# Patient Record
Sex: FEMALE | Race: OTHER | NOT HISPANIC OR LATINO
[De-identification: names, ages, dates, MRNs, and addresses within clinical notes are randomized per-mention and may not be internally consistent; named-entity substitution may affect disease eponyms.]

---

## 2019-05-06 ENCOUNTER — RESULT REVIEW (OUTPATIENT)
Age: 39
End: 2019-05-06

## 2019-05-06 ENCOUNTER — APPOINTMENT (OUTPATIENT)
Dept: INTERNAL MEDICINE | Facility: CLINIC | Age: 39
End: 2019-05-06
Payer: COMMERCIAL

## 2019-05-06 VITALS
HEIGHT: 62 IN | TEMPERATURE: 98.6 F | BODY MASS INDEX: 22.26 KG/M2 | HEART RATE: 76 BPM | DIASTOLIC BLOOD PRESSURE: 80 MMHG | SYSTOLIC BLOOD PRESSURE: 100 MMHG | WEIGHT: 121 LBS | OXYGEN SATURATION: 99 %

## 2019-05-06 DIAGNOSIS — Z12.31 ENCOUNTER FOR SCREENING MAMMOGRAM FOR MALIGNANT NEOPLASM OF BREAST: ICD-10-CM

## 2019-05-06 PROCEDURE — 99202 OFFICE O/P NEW SF 15 MIN: CPT

## 2019-05-06 NOTE — HEALTH RISK ASSESSMENT
[0] : 2) Feeling down, depressed, or hopeless: Not at all (0) [HIV test declined] : HIV test declined [Hepatitis C test declined] : Hepatitis C test declined [Good] : ~his/her~ current health as good [] : No [de-identified] : social  [MammogramDate] : never [ColonoscopyDate] : never [BoneDensityDate] : never [PapSmearDate] : 2017

## 2019-05-06 NOTE — HISTORY OF PRESENT ILLNESS
[FreeTextEntry1] : new patient visit  [de-identified] : patient is here for a new patient consultation. Patient currently feels fine and has no complaints, she needs a script for a mammogram and she will schedule an annual at a later date.

## 2019-05-06 NOTE — PHYSICAL EXAM
[No Acute Distress] : no acute distress [Well Nourished] : well nourished [Well Developed] : well developed [Well-Appearing] : well-appearing [EOMI] : extraocular movements intact [PERRL] : pupils equal round and reactive to light [Normal Sclera/Conjunctiva] : normal sclera/conjunctiva [Normal Outer Ear/Nose] : the outer ears and nose were normal in appearance [Normal Oropharynx] : the oropharynx was normal [No JVD] : no jugular venous distention [No Lymphadenopathy] : no lymphadenopathy [Supple] : supple [Thyroid Normal, No Nodules] : the thyroid was normal and there were no nodules present [No Respiratory Distress] : no respiratory distress  [Clear to Auscultation] : lungs were clear to auscultation bilaterally [No Accessory Muscle Use] : no accessory muscle use [Regular Rhythm] : with a regular rhythm [Normal Rate] : normal rate  [No Murmur] : no murmur heard [Normal S1, S2] : normal S1 and S2 [Soft] : abdomen soft [Non-distended] : non-distended [No Masses] : no abdominal mass palpated [No HSM] : no HSM [Normal Posterior Cervical Nodes] : no posterior cervical lymphadenopathy [No Joint Swelling] : no joint swelling [Grossly Normal Strength/Tone] : grossly normal strength/tone [Normal Gait] : normal gait [Normal Insight/Judgement] : insight and judgment were intact

## 2019-05-07 ENCOUNTER — RECORD ABSTRACTING (OUTPATIENT)
Age: 39
End: 2019-05-07

## 2019-05-07 DIAGNOSIS — Z78.9 OTHER SPECIFIED HEALTH STATUS: ICD-10-CM

## 2019-05-20 ENCOUNTER — APPOINTMENT (OUTPATIENT)
Dept: INTERNAL MEDICINE | Facility: CLINIC | Age: 39
End: 2019-05-20
Payer: COMMERCIAL

## 2019-05-20 VITALS
HEIGHT: 62 IN | WEIGHT: 121 LBS | OXYGEN SATURATION: 99 % | BODY MASS INDEX: 22.26 KG/M2 | SYSTOLIC BLOOD PRESSURE: 100 MMHG | DIASTOLIC BLOOD PRESSURE: 60 MMHG | HEART RATE: 69 BPM

## 2019-05-20 PROCEDURE — 36415 COLL VENOUS BLD VENIPUNCTURE: CPT

## 2019-05-20 PROCEDURE — 93000 ELECTROCARDIOGRAM COMPLETE: CPT

## 2019-05-20 PROCEDURE — 99396 PREV VISIT EST AGE 40-64: CPT | Mod: 25

## 2019-05-21 NOTE — HEALTH RISK ASSESSMENT
[Very Good] : ~his/her~  mood as very good [Patient reported mammogram was normal] : Patient reported mammogram was normal [Patient reported PAP Smear was normal] : Patient reported PAP Smear was normal [HIV test declined] : HIV test declined [Hepatitis C test declined] : Hepatitis C test declined [] : No [Change in mental status noted] : No change in mental status noted [MammogramDate] : 2019 [PapSmearDate] : 2017 [BoneDensityDate] : na [ColonoscopyDate] : na

## 2019-05-21 NOTE — HISTORY OF PRESENT ILLNESS
[FreeTextEntry1] : annual exam [de-identified] : Patient is here for an annual exam. Currently feels fine. Has no complaints

## 2019-05-24 LAB
25(OH)D3 SERPL-MCNC: 23.1 NG/ML
ALBUMIN SERPL ELPH-MCNC: 4.2 G/DL
ALP BLD-CCNC: 41 U/L
ALT SERPL-CCNC: 17 U/L
ANION GAP SERPL CALC-SCNC: 10 MMOL/L
AST SERPL-CCNC: 14 U/L
BASOPHILS # BLD AUTO: 0.02 K/UL
BASOPHILS NFR BLD AUTO: 0.3 %
BILIRUB SERPL-MCNC: 0.4 MG/DL
BUN SERPL-MCNC: 15 MG/DL
CALCIUM SERPL-MCNC: 9 MG/DL
CHLORIDE SERPL-SCNC: 105 MMOL/L
CHOLEST SERPL-MCNC: 133 MG/DL
CHOLEST/HDLC SERPL: 2 RATIO
CO2 SERPL-SCNC: 24 MMOL/L
CREAT SERPL-MCNC: 0.74 MG/DL
EOSINOPHIL # BLD AUTO: 0.14 K/UL
EOSINOPHIL NFR BLD AUTO: 2.2 %
ESTIMATED AVERAGE GLUCOSE: 97 MG/DL
GLUCOSE SERPL-MCNC: 98 MG/DL
HBA1C MFR BLD HPLC: 5 %
HCT VFR BLD CALC: 40.4 %
HDLC SERPL-MCNC: 68 MG/DL
HGB BLD-MCNC: 12.5 G/DL
IMM GRANULOCYTES NFR BLD AUTO: 0.2 %
LDLC SERPL CALC-MCNC: 56 MG/DL
LYMPHOCYTES # BLD AUTO: 1.21 K/UL
LYMPHOCYTES NFR BLD AUTO: 19.1 %
MAN DIFF?: NORMAL
MCHC RBC-ENTMCNC: 30.9 GM/DL
MCHC RBC-ENTMCNC: 31.1 PG
MCV RBC AUTO: 100.5 FL
MEV IGG FLD QL IA: 242 AU/ML
MEV IGG+IGM SER-IMP: POSITIVE
MONOCYTES # BLD AUTO: 0.3 K/UL
MONOCYTES NFR BLD AUTO: 4.7 %
NEUTROPHILS # BLD AUTO: 4.67 K/UL
NEUTROPHILS NFR BLD AUTO: 73.5 %
PLATELET # BLD AUTO: 220 K/UL
POTASSIUM SERPL-SCNC: 4.6 MMOL/L
PROT SERPL-MCNC: 6.4 G/DL
RBC # BLD: 4.02 M/UL
RBC # FLD: 13.1 %
RUBV IGG FLD-ACNC: 12.7 INDEX
RUBV IGG SER-IMP: POSITIVE
SODIUM SERPL-SCNC: 139 MMOL/L
T4 SERPL-MCNC: 4.4 UG/DL
TRIGL SERPL-MCNC: 45 MG/DL
TSH SERPL-ACNC: 0.89 UIU/ML
VIT B12 SERPL-MCNC: 305 PG/ML
WBC # FLD AUTO: 6.35 K/UL

## 2019-06-03 ENCOUNTER — TRANSCRIPTION ENCOUNTER (OUTPATIENT)
Age: 39
End: 2019-06-03

## 2019-10-11 ENCOUNTER — APPOINTMENT (OUTPATIENT)
Dept: OBGYN | Facility: CLINIC | Age: 39
End: 2019-10-11
Payer: COMMERCIAL

## 2019-10-11 VITALS
HEIGHT: 62 IN | BODY MASS INDEX: 23 KG/M2 | SYSTOLIC BLOOD PRESSURE: 100 MMHG | WEIGHT: 125 LBS | DIASTOLIC BLOOD PRESSURE: 68 MMHG

## 2019-10-11 DIAGNOSIS — Z12.31 ENCOUNTER FOR SCREENING MAMMOGRAM FOR MALIGNANT NEOPLASM OF BREAST: ICD-10-CM

## 2019-10-11 DIAGNOSIS — R10.2 PELVIC AND PERINEAL PAIN: ICD-10-CM

## 2019-10-11 PROCEDURE — 99396 PREV VISIT EST AGE 40-64: CPT

## 2019-10-11 NOTE — PHYSICAL EXAM
[Awake] : awake [Mass] : no breast mass [Alert] : alert [Acute Distress] : no acute distress [Axillary LAD] : no axillary lymphadenopathy [Nipple Discharge] : no nipple discharge [Soft] : soft [Oriented x3] : oriented to person, place, and time [Tender] : non tender [No Bleeding] : there was no active vaginal bleeding [Normal] : uterus [Uterine Adnexae] : were not tender and not enlarged [FreeTextEntry4] : thin discharge c/w BV [FreeTextEntry7] : R adnexal tenderness, sharp tenderness in deep palpation

## 2019-10-21 ENCOUNTER — RESULT REVIEW (OUTPATIENT)
Age: 39
End: 2019-10-21

## 2019-11-14 LAB
CANDIDA VAG CYTO: NOT DETECTED
CYTOLOGY CVX/VAG DOC THIN PREP: NORMAL
G VAGINALIS+PREV SP MTYP VAG QL MICRO: NOT DETECTED
HPV HIGH+LOW RISK DNA PNL CVX: NOT DETECTED
T VAGINALIS VAG QL WET PREP: NOT DETECTED

## 2020-07-16 ENCOUNTER — RESULT REVIEW (OUTPATIENT)
Age: 40
End: 2020-07-16

## 2020-12-10 ENCOUNTER — NON-APPOINTMENT (OUTPATIENT)
Age: 40
End: 2020-12-10

## 2020-12-10 ENCOUNTER — APPOINTMENT (OUTPATIENT)
Dept: INTERNAL MEDICINE | Facility: CLINIC | Age: 40
End: 2020-12-10
Payer: COMMERCIAL

## 2020-12-10 ENCOUNTER — LABORATORY RESULT (OUTPATIENT)
Age: 40
End: 2020-12-10

## 2020-12-10 VITALS
WEIGHT: 125 LBS | HEART RATE: 74 BPM | DIASTOLIC BLOOD PRESSURE: 60 MMHG | OXYGEN SATURATION: 98 % | SYSTOLIC BLOOD PRESSURE: 108 MMHG | TEMPERATURE: 98.8 F | BODY MASS INDEX: 23 KG/M2 | HEIGHT: 62 IN

## 2020-12-10 DIAGNOSIS — E55.9 VITAMIN D DEFICIENCY, UNSPECIFIED: ICD-10-CM

## 2020-12-10 PROCEDURE — 93000 ELECTROCARDIOGRAM COMPLETE: CPT | Mod: 59

## 2020-12-10 PROCEDURE — 36415 COLL VENOUS BLD VENIPUNCTURE: CPT

## 2020-12-10 PROCEDURE — G0442 ANNUAL ALCOHOL SCREEN 15 MIN: CPT

## 2020-12-10 PROCEDURE — G0444 DEPRESSION SCREEN ANNUAL: CPT

## 2020-12-10 PROCEDURE — 99072 ADDL SUPL MATRL&STAF TM PHE: CPT

## 2020-12-10 PROCEDURE — 99396 PREV VISIT EST AGE 40-64: CPT | Mod: 25

## 2020-12-11 PROBLEM — E55.9 MILD VITAMIN D DEFICIENCY: Status: ACTIVE | Noted: 2020-12-10

## 2020-12-15 LAB
25(OH)D3 SERPL-MCNC: 36.9 NG/ML
ALBUMIN SERPL ELPH-MCNC: 4.8 G/DL
ALP BLD-CCNC: 49 U/L
ALT SERPL-CCNC: 16 U/L
ANION GAP SERPL CALC-SCNC: 9 MMOL/L
AST SERPL-CCNC: 18 U/L
B BURGDOR AB SER-IMP: NEGATIVE
B BURGDOR IGG+IGM SER QL IB: NORMAL
B BURGDOR IGM PATRN SER IB-IMP: NEGATIVE
B BURGDOR18KD IGG SER QL IB: NORMAL
B BURGDOR23KD IGG SER QL IB: NORMAL
B BURGDOR23KD IGM SER QL IB: NORMAL
B BURGDOR28KD IGG SER QL IB: NORMAL
B BURGDOR30KD IGG SER QL IB: NORMAL
B BURGDOR31KD IGG SER QL IB: NORMAL
B BURGDOR39KD IGG SER QL IB: NORMAL
B BURGDOR39KD IGM SER QL IB: NORMAL
B BURGDOR41KD IGG SER QL IB: PRESENT
B BURGDOR41KD IGM SER QL IB: PRESENT
B BURGDOR45KD IGG SER QL IB: NORMAL
B BURGDOR58KD IGG SER QL IB: NORMAL
B BURGDOR66KD IGG SER QL IB: NORMAL
B BURGDOR93KD IGG SER QL IB: NORMAL
BASOPHILS # BLD AUTO: 0.03 K/UL
BASOPHILS NFR BLD AUTO: 0.6 %
BILIRUB SERPL-MCNC: 0.7 MG/DL
BUN SERPL-MCNC: 12 MG/DL
CALCIUM SERPL-MCNC: 9.5 MG/DL
CHLORIDE SERPL-SCNC: 103 MMOL/L
CHOLEST SERPL-MCNC: 178 MG/DL
CO2 SERPL-SCNC: 24 MMOL/L
CREAT SERPL-MCNC: 0.82 MG/DL
EOSINOPHIL # BLD AUTO: 0.23 K/UL
EOSINOPHIL NFR BLD AUTO: 4.2 %
ESTIMATED AVERAGE GLUCOSE: 97 MG/DL
GLUCOSE SERPL-MCNC: 102 MG/DL
HBA1C MFR BLD HPLC: 5 %
HCT VFR BLD CALC: 43.5 %
HDLC SERPL-MCNC: 90 MG/DL
HGB BLD-MCNC: 14.1 G/DL
IMM GRANULOCYTES NFR BLD AUTO: 0.2 %
LDLC SERPL CALC-MCNC: 76 MG/DL
LYMPHOCYTES # BLD AUTO: 1.45 K/UL
LYMPHOCYTES NFR BLD AUTO: 26.6 %
MAN DIFF?: NORMAL
MCHC RBC-ENTMCNC: 30.9 PG
MCHC RBC-ENTMCNC: 32.4 GM/DL
MCV RBC AUTO: 95.2 FL
MONOCYTES # BLD AUTO: 0.29 K/UL
MONOCYTES NFR BLD AUTO: 5.3 %
NEUTROPHILS # BLD AUTO: 3.44 K/UL
NEUTROPHILS NFR BLD AUTO: 63.1 %
NONHDLC SERPL-MCNC: 88 MG/DL
PLATELET # BLD AUTO: 261 K/UL
POTASSIUM SERPL-SCNC: 4.2 MMOL/L
PROT SERPL-MCNC: 7.3 G/DL
RBC # BLD: 4.57 M/UL
RBC # FLD: 12.7 %
SARS-COV-2 IGG SERPL IA-ACNC: 0.09 INDEX
SARS-COV-2 IGG SERPL QL IA: NEGATIVE
SODIUM SERPL-SCNC: 137 MMOL/L
T4 SERPL-MCNC: 4.7 UG/DL
TRIGL SERPL-MCNC: 61 MG/DL
TSH SERPL-ACNC: 1.56 UIU/ML
VIT B12 SERPL-MCNC: 402 PG/ML
WBC # FLD AUTO: 5.45 K/UL

## 2021-05-25 DIAGNOSIS — Z11.3 ENCOUNTER FOR SCREENING FOR INFECTIONS WITH A PREDOMINANTLY SEXUAL MODE OF TRANSMISSION: ICD-10-CM

## 2021-06-04 ENCOUNTER — NON-APPOINTMENT (OUTPATIENT)
Age: 41
End: 2021-06-04

## 2021-06-04 ENCOUNTER — APPOINTMENT (OUTPATIENT)
Dept: OBGYN | Facility: CLINIC | Age: 41
End: 2021-06-04
Payer: COMMERCIAL

## 2021-06-04 VITALS
TEMPERATURE: 98.7 F | WEIGHT: 126 LBS | HEIGHT: 62 IN | DIASTOLIC BLOOD PRESSURE: 80 MMHG | BODY MASS INDEX: 23.19 KG/M2 | SYSTOLIC BLOOD PRESSURE: 110 MMHG

## 2021-06-04 DIAGNOSIS — R92.2 INCONCLUSIVE MAMMOGRAM: ICD-10-CM

## 2021-06-04 DIAGNOSIS — Z01.419 ENCOUNTER FOR GYNECOLOGICAL EXAMINATION (GENERAL) (ROUTINE) W/OUT ABNORMAL FINDINGS: ICD-10-CM

## 2021-06-04 PROCEDURE — 99072 ADDL SUPL MATRL&STAF TM PHE: CPT

## 2021-06-04 PROCEDURE — 99396 PREV VISIT EST AGE 40-64: CPT

## 2021-06-05 LAB — HPV HIGH+LOW RISK DNA PNL CVX: NOT DETECTED

## 2021-06-10 LAB — CYTOLOGY CVX/VAG DOC THIN PREP: NORMAL

## 2021-06-14 NOTE — HISTORY OF PRESENT ILLNESS
[TextBox_4] : 39yo P2 here for annual exam. Reg periods. No complaints- went for pelvic sono last year for intermittent abd pain which is better now.\par \par Moved to [place] from [place] several years ago. Misses Donlyn; She is  and has 2 boys- 6yo and 10yo. 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

## 2021-07-27 ENCOUNTER — RESULT REVIEW (OUTPATIENT)
Age: 41
End: 2021-07-27

## 2021-12-14 ENCOUNTER — APPOINTMENT (OUTPATIENT)
Dept: FAMILY MEDICINE | Facility: CLINIC | Age: 41
End: 2021-12-14
Payer: COMMERCIAL

## 2021-12-14 VITALS
DIASTOLIC BLOOD PRESSURE: 80 MMHG | HEIGHT: 62 IN | HEART RATE: 84 BPM | OXYGEN SATURATION: 98 % | BODY MASS INDEX: 23.92 KG/M2 | WEIGHT: 130 LBS | RESPIRATION RATE: 14 BRPM | TEMPERATURE: 98.1 F | SYSTOLIC BLOOD PRESSURE: 130 MMHG

## 2021-12-14 DIAGNOSIS — J30.2 OTHER SEASONAL ALLERGIC RHINITIS: ICD-10-CM

## 2021-12-14 DIAGNOSIS — Z00.00 ENCOUNTER FOR GENERAL ADULT MEDICAL EXAMINATION W/OUT ABNORMAL FINDINGS: ICD-10-CM

## 2021-12-14 DIAGNOSIS — R10.11 RIGHT UPPER QUADRANT PAIN: ICD-10-CM

## 2021-12-14 PROCEDURE — 99072 ADDL SUPL MATRL&STAF TM PHE: CPT

## 2021-12-14 PROCEDURE — G0442 ANNUAL ALCOHOL SCREEN 15 MIN: CPT

## 2021-12-14 PROCEDURE — 99396 PREV VISIT EST AGE 40-64: CPT | Mod: 25

## 2021-12-14 PROCEDURE — 36415 COLL VENOUS BLD VENIPUNCTURE: CPT

## 2021-12-14 RX ORDER — TINIDAZOLE 500 MG/1
500 TABLET, FILM COATED ORAL DAILY
Qty: 8 | Refills: 1 | Status: DISCONTINUED | COMMUNITY
Start: 2019-10-11 | End: 2021-12-14

## 2021-12-15 ENCOUNTER — NON-APPOINTMENT (OUTPATIENT)
Age: 41
End: 2021-12-15

## 2021-12-15 LAB
ALBUMIN SERPL ELPH-MCNC: 4.7 G/DL
ALP BLD-CCNC: 51 U/L
ALT SERPL-CCNC: 21 U/L
ANION GAP SERPL CALC-SCNC: 11 MMOL/L
AST SERPL-CCNC: 22 U/L
BILIRUB SERPL-MCNC: 0.6 MG/DL
BUN SERPL-MCNC: 11 MG/DL
CALCIUM SERPL-MCNC: 9.1 MG/DL
CHLORIDE SERPL-SCNC: 104 MMOL/L
CO2 SERPL-SCNC: 24 MMOL/L
CREAT SERPL-MCNC: 0.79 MG/DL
GLUCOSE SERPL-MCNC: 94 MG/DL
POTASSIUM SERPL-SCNC: 4.1 MMOL/L
PROT SERPL-MCNC: 7.2 G/DL
SODIUM SERPL-SCNC: 139 MMOL/L

## 2021-12-17 ENCOUNTER — RESULT REVIEW (OUTPATIENT)
Age: 41
End: 2021-12-17

## 2022-05-27 ENCOUNTER — NON-APPOINTMENT (OUTPATIENT)
Age: 42
End: 2022-05-27

## 2022-07-14 ENCOUNTER — NON-APPOINTMENT (OUTPATIENT)
Age: 42
End: 2022-07-14

## 2022-09-14 ENCOUNTER — RESULT REVIEW (OUTPATIENT)
Age: 42
End: 2022-09-14

## 2022-12-16 ENCOUNTER — LABORATORY RESULT (OUTPATIENT)
Age: 42
End: 2022-12-16

## 2022-12-16 ENCOUNTER — APPOINTMENT (OUTPATIENT)
Dept: INTERNAL MEDICINE | Facility: CLINIC | Age: 42
End: 2022-12-16

## 2022-12-16 VITALS
HEIGHT: 62 IN | WEIGHT: 130 LBS | SYSTOLIC BLOOD PRESSURE: 110 MMHG | DIASTOLIC BLOOD PRESSURE: 74 MMHG | HEART RATE: 82 BPM | BODY MASS INDEX: 23.92 KG/M2 | OXYGEN SATURATION: 99 % | RESPIRATION RATE: 16 BRPM

## 2022-12-16 PROCEDURE — G0444 DEPRESSION SCREEN ANNUAL: CPT | Mod: 59

## 2022-12-16 PROCEDURE — 36415 COLL VENOUS BLD VENIPUNCTURE: CPT

## 2022-12-16 PROCEDURE — 99396 PREV VISIT EST AGE 40-64: CPT | Mod: 25

## 2022-12-23 ENCOUNTER — TRANSCRIPTION ENCOUNTER (OUTPATIENT)
Age: 42
End: 2022-12-23

## 2022-12-23 LAB
25(OH)D3 SERPL-MCNC: 30.3 NG/ML
ALBUMIN SERPL ELPH-MCNC: 4.5 G/DL
ALP BLD-CCNC: 51 U/L
ALT SERPL-CCNC: 17 U/L
ANION GAP SERPL CALC-SCNC: 10 MMOL/L
APPEARANCE: CLEAR
AST SERPL-CCNC: 21 U/L
BASOPHILS # BLD AUTO: 0.05 K/UL
BASOPHILS NFR BLD AUTO: 1 %
BILIRUB SERPL-MCNC: 0.6 MG/DL
BILIRUBIN URINE: NEGATIVE
BLOOD URINE: NEGATIVE
BUN SERPL-MCNC: 11 MG/DL
CALCIUM SERPL-MCNC: 9 MG/DL
CHLORIDE SERPL-SCNC: 103 MMOL/L
CHOLEST SERPL-MCNC: 168 MG/DL
CO2 SERPL-SCNC: 22 MMOL/L
COLOR: YELLOW
CREAT SERPL-MCNC: 0.77 MG/DL
EGFR: 97 ML/MIN/1.73M2
EOSINOPHIL # BLD AUTO: 0.21 K/UL
EOSINOPHIL NFR BLD AUTO: 4.1 %
ESTIMATED AVERAGE GLUCOSE: 105 MG/DL
GLUCOSE QUALITATIVE U: NEGATIVE
GLUCOSE SERPL-MCNC: 102 MG/DL
HBA1C MFR BLD HPLC: 5.3 %
HCT VFR BLD CALC: 42.3 %
HDLC SERPL-MCNC: 78 MG/DL
HGB BLD-MCNC: 13.7 G/DL
IMM GRANULOCYTES NFR BLD AUTO: 0.2 %
KETONES URINE: NEGATIVE
LDLC SERPL CALC-MCNC: 78 MG/DL
LEUKOCYTE ESTERASE URINE: NEGATIVE
LYMPHOCYTES # BLD AUTO: 1.4 K/UL
LYMPHOCYTES NFR BLD AUTO: 27.6 %
MAN DIFF?: NORMAL
MCHC RBC-ENTMCNC: 31.2 PG
MCHC RBC-ENTMCNC: 32.4 GM/DL
MCV RBC AUTO: 96.4 FL
MONOCYTES # BLD AUTO: 0.23 K/UL
MONOCYTES NFR BLD AUTO: 4.5 %
NEUTROPHILS # BLD AUTO: 3.17 K/UL
NEUTROPHILS NFR BLD AUTO: 62.6 %
NITRITE URINE: NEGATIVE
NONHDLC SERPL-MCNC: 90 MG/DL
PH URINE: 7.5
PLATELET # BLD AUTO: 290 K/UL
POTASSIUM SERPL-SCNC: 4.3 MMOL/L
PROT SERPL-MCNC: 6.8 G/DL
PROTEIN URINE: ABNORMAL
RBC # BLD: 4.39 M/UL
RBC # FLD: 12.3 %
SODIUM SERPL-SCNC: 136 MMOL/L
SPECIFIC GRAVITY URINE: 1.02
T4 SERPL-MCNC: 5 UG/DL
TRIGL SERPL-MCNC: 63 MG/DL
TSH SERPL-ACNC: 1.1 UIU/ML
UROBILINOGEN URINE: NORMAL
VIT B12 SERPL-MCNC: 489 PG/ML
WBC # FLD AUTO: 5.07 K/UL

## 2023-03-07 ENCOUNTER — APPOINTMENT (OUTPATIENT)
Dept: OBGYN | Facility: CLINIC | Age: 43
End: 2023-03-07

## 2023-09-15 ENCOUNTER — RESULT REVIEW (OUTPATIENT)
Age: 43
End: 2023-09-15

## 2023-11-06 ENCOUNTER — NON-APPOINTMENT (OUTPATIENT)
Age: 43
End: 2023-11-06

## 2023-12-19 ENCOUNTER — APPOINTMENT (OUTPATIENT)
Dept: INTERNAL MEDICINE | Facility: CLINIC | Age: 43
End: 2023-12-19
Payer: COMMERCIAL

## 2023-12-19 VITALS
TEMPERATURE: 97.6 F | DIASTOLIC BLOOD PRESSURE: 78 MMHG | HEIGHT: 62 IN | BODY MASS INDEX: 25.4 KG/M2 | WEIGHT: 138 LBS | SYSTOLIC BLOOD PRESSURE: 118 MMHG | RESPIRATION RATE: 16 BRPM | OXYGEN SATURATION: 98 % | HEART RATE: 72 BPM

## 2023-12-19 DIAGNOSIS — Z00.00 ENCOUNTER FOR GENERAL ADULT MEDICAL EXAMINATION W/OUT ABNORMAL FINDINGS: ICD-10-CM

## 2023-12-19 PROCEDURE — 99396 PREV VISIT EST AGE 40-64: CPT | Mod: 25

## 2023-12-19 PROCEDURE — 36415 COLL VENOUS BLD VENIPUNCTURE: CPT

## 2023-12-19 NOTE — HEALTH RISK ASSESSMENT
[Yes] : Yes [Monthly or less (1 pt)] : Monthly or less (1 point) [3 or 4 (1 pt)] : 3 or 4  (1 point) [Never (0 pts)] : Never (0 points) [No] : In the past 12 months have you used drugs other than those required for medical reasons? No [No falls in past year] : Patient reported no falls in the past year [0] : 2) Feeling down, depressed, or hopeless: Not at all (0) [PHQ-2 Negative - No further assessment needed] : PHQ-2 Negative - No further assessment needed [Patient reported mammogram was normal] : Patient reported mammogram was normal [Patient reported PAP Smear was normal] : Patient reported PAP Smear was normal [With Family] : lives with family [Employed] : employed [] :  [Fully functional (bathing, dressing, toileting, transferring, walking, feeding)] : Fully functional (bathing, dressing, toileting, transferring, walking, feeding) [Fully functional (using the telephone, shopping, preparing meals, housekeeping, doing laundry, using] : Fully functional and needs no help or supervision to perform IADLs (using the telephone, shopping, preparing meals, housekeeping, doing laundry, using transportation, managing medications and managing finances) [Never] : Never [Very Good] : ~his/her~  mood as very good [Audit-CScore] : 2 [[id number]] : 0 [Reports changes in hearing] : Reports no changes in hearing [Reports changes in vision] : Reports no changes in vision [Reports changes in dental health] : Reports no changes in dental health [Safety elements used in home] : safety elements used in home [Seat Belt] :  uses seat belt [Sunscreen] : uses sunscreen [MammogramDate] : 09/23 [PapSmearDate] : 10/22

## 2023-12-19 NOTE — HISTORY OF PRESENT ILLNESS
[FreeTextEntry1] : Annual exam [de-identified] : 1. annual exam  2.Patient presents to the office today for an annual exam.  She currently feels fine.  She has no major complaints.  She has a line on her finger that is dark.  It was seen by dermatology.  He said there was nothing wrong with her.  No other complaints at this time.  Patient reports good energy.  She is willing to do colonoscopy.

## 2023-12-21 ENCOUNTER — TRANSCRIPTION ENCOUNTER (OUTPATIENT)
Age: 43
End: 2023-12-21

## 2023-12-21 LAB
25(OH)D3 SERPL-MCNC: 38.2 NG/ML
ALBUMIN SERPL ELPH-MCNC: 4.5 G/DL
ALP BLD-CCNC: 53 U/L
ALT SERPL-CCNC: 17 U/L
ANION GAP SERPL CALC-SCNC: 12 MMOL/L
APPEARANCE: CLEAR
AST SERPL-CCNC: 17 U/L
BASOPHILS # BLD AUTO: 0.04 K/UL
BASOPHILS NFR BLD AUTO: 0.6 %
BILIRUB SERPL-MCNC: 0.6 MG/DL
BILIRUBIN URINE: NEGATIVE
BLOOD URINE: NEGATIVE
BUN SERPL-MCNC: 10 MG/DL
CALCIUM SERPL-MCNC: 9.3 MG/DL
CHLORIDE SERPL-SCNC: 103 MMOL/L
CHOLEST SERPL-MCNC: 168 MG/DL
CO2 SERPL-SCNC: 24 MMOL/L
COLOR: YELLOW
CREAT SERPL-MCNC: 0.79 MG/DL
EGFR: 94 ML/MIN/1.73M2
EOSINOPHIL # BLD AUTO: 0.08 K/UL
EOSINOPHIL NFR BLD AUTO: 1.3 %
ESTIMATED AVERAGE GLUCOSE: 103 MG/DL
GLUCOSE QUALITATIVE U: NEGATIVE MG/DL
GLUCOSE SERPL-MCNC: 100 MG/DL
HBA1C MFR BLD HPLC: 5.2 %
HCT VFR BLD CALC: 41.3 %
HDLC SERPL-MCNC: 83 MG/DL
HGB BLD-MCNC: 13.2 G/DL
IMM GRANULOCYTES NFR BLD AUTO: 0.3 %
KETONES URINE: NEGATIVE MG/DL
LDLC SERPL CALC-MCNC: 73 MG/DL
LEUKOCYTE ESTERASE URINE: NEGATIVE
LYMPHOCYTES # BLD AUTO: 1.43 K/UL
LYMPHOCYTES NFR BLD AUTO: 23 %
MAN DIFF?: NORMAL
MCHC RBC-ENTMCNC: 30.6 PG
MCHC RBC-ENTMCNC: 32 GM/DL
MCV RBC AUTO: 95.8 FL
MONOCYTES # BLD AUTO: 0.27 K/UL
MONOCYTES NFR BLD AUTO: 4.3 %
NEUTROPHILS # BLD AUTO: 4.39 K/UL
NEUTROPHILS NFR BLD AUTO: 70.5 %
NITRITE URINE: NEGATIVE
NONHDLC SERPL-MCNC: 85 MG/DL
PH URINE: 7
PLATELET # BLD AUTO: 257 K/UL
POTASSIUM SERPL-SCNC: 4.5 MMOL/L
PROT SERPL-MCNC: 6.7 G/DL
PROTEIN URINE: NEGATIVE MG/DL
RBC # BLD: 4.31 M/UL
RBC # FLD: 12.6 %
SODIUM SERPL-SCNC: 138 MMOL/L
SPECIFIC GRAVITY URINE: 1.01
T4 SERPL-MCNC: 5.7 UG/DL
TRIGL SERPL-MCNC: 58 MG/DL
TSH SERPL-ACNC: 1.43 UIU/ML
UROBILINOGEN URINE: 0.2 MG/DL
VIT B12 SERPL-MCNC: 638 PG/ML
WBC # FLD AUTO: 6.23 K/UL

## 2023-12-22 ENCOUNTER — TRANSCRIPTION ENCOUNTER (OUTPATIENT)
Age: 43
End: 2023-12-22

## 2024-08-29 ENCOUNTER — APPOINTMENT (OUTPATIENT)
Dept: GASTROENTEROLOGY | Facility: CLINIC | Age: 44
End: 2024-08-29

## 2024-08-29 VITALS
HEIGHT: 63 IN | WEIGHT: 130 LBS | DIASTOLIC BLOOD PRESSURE: 60 MMHG | OXYGEN SATURATION: 99 % | SYSTOLIC BLOOD PRESSURE: 124 MMHG | HEART RATE: 86 BPM | BODY MASS INDEX: 23.04 KG/M2

## 2024-08-29 DIAGNOSIS — Z12.11 ENCOUNTER FOR SCREENING FOR MALIGNANT NEOPLASM OF COLON: ICD-10-CM

## 2024-08-29 DIAGNOSIS — K59.04 CHRONIC IDIOPATHIC CONSTIPATION: ICD-10-CM

## 2024-08-29 PROCEDURE — S0285 CNSLT BEFORE SCREEN COLONOSC: CPT

## 2024-08-29 PROCEDURE — G2211 COMPLEX E/M VISIT ADD ON: CPT

## 2024-08-29 RX ORDER — BACILLUS COAGULANS/INULIN 1B-250 MG
CAPSULE ORAL
Refills: 0 | Status: ACTIVE | COMMUNITY

## 2024-08-29 RX ORDER — SODIUM SULFATE, POTASSIUM SULFATE AND MAGNESIUM SULFATE 1.6; 3.13; 17.5 G/177ML; G/177ML; G/177ML
17.5-3.13-1.6 SOLUTION ORAL
Qty: 1 | Refills: 0 | Status: ACTIVE | COMMUNITY
Start: 2024-08-29 | End: 1900-01-01

## 2024-08-31 NOTE — HISTORY OF PRESENT ILLNESS
[FreeTextEntry1] : 43 year yo F PMH csection, rhinoplasty after nasal fracture,  presents for the evaluation of constipation, colon cancer screening.  seen at the request of Dr. Eduar Raya  she has bm q 3 days since childhood.  feels bloated if it has been several days without a bm does not take laxatives.  started to eat dates, which helps also started probiotic which she feels is helpful. bm q2 days.  tries to limit carbs, eats a lot of veges and salad and fruits.   she used to get colonics 1 x per month  Patient denies abdominal pain , n/v/heartburn, reflux, dysphagia/odynophagia, change in bowel habits, diarrhea, constipation, brbpr, melena. no weight loss. no jaundice/pruritus/fatigue.  Good appetite. Patient has daily BM, denies regular NSAID use.     Soc: no tobacco , exercises regularly- boxing and tennis. 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   Family Hx: no significant GI family history including colon cancer, stomach cancer, IBD, celiac   ROS: constitutional: no weight loss, fevers ENT: no deafness Eyes: no blindness Neck: no lymphadenopathy Chest: no shortness of breath, no cough Cardiac: no chest pain, no palpitations Vascular: no leg swelling GI: as noted in hpi : no dysuria, dark urine Skin: no rashes, lesions, jaundice Heme: no bleeding Endocrine: no DM unless otherwise stated in HPI   Physical Exam: (VS noted below) General: alert, comfortable, in no acute distress Eyes: normal sclera, anicteric Neck: normal, supple, no neck mass Pulm: no respiratory distress, clear to auscultation bilaterally Heart: RRR, normal S1 S2 Abd: Soft, non-tender, non-distended, normal bowel sounds, no appreciable hepatosplenomegaly, no masses palpated Rectal: deferred Ext: warm and well perfused, no edema Skin: no rashes, no jaundice Neuro: alert, grossly nonfocal   Labs/imaging/prior endoscopic results were reviewed to the extent available and pertinent findings noted in HPI

## 2024-08-31 NOTE — ASSESSMENT
[FreeTextEntry1] : Due for crc screening Risks (including but not limited to sedation risks, infection, bleeding, perforation, possibility of missed lesions, spleen injury), benefits and alternatives to procedure, including not doing the procedure, were discussed with patient. Patient understood and agreed to proceed with colonoscopy. Colonoscopy preparation instructions reviewed with patient.   3 days prior to procedure: MiraLAX 17 g in 8 oz of water twice a day 1 day prior to procedure:  2 dulcolax at noon, split Suprep  Chronic constipation-  high fiber diet MiraLAX for goal 1 bm daily- every other day patient instructed to call if no improvement with daily MiraLAX, can try Linzess.

## 2024-11-12 ENCOUNTER — RESULT REVIEW (OUTPATIENT)
Age: 44
End: 2024-11-12

## 2024-11-18 ENCOUNTER — RESULT REVIEW (OUTPATIENT)
Age: 44
End: 2024-11-18

## 2024-11-18 ENCOUNTER — APPOINTMENT (OUTPATIENT)
Dept: GASTROENTEROLOGY | Facility: HOSPITAL | Age: 44
End: 2024-11-18

## 2024-12-20 ENCOUNTER — NON-APPOINTMENT (OUTPATIENT)
Age: 44
End: 2024-12-20

## 2024-12-20 ENCOUNTER — APPOINTMENT (OUTPATIENT)
Dept: FAMILY MEDICINE | Facility: CLINIC | Age: 44
End: 2024-12-20
Payer: COMMERCIAL

## 2024-12-20 VITALS
HEIGHT: 63 IN | DIASTOLIC BLOOD PRESSURE: 78 MMHG | RESPIRATION RATE: 16 BRPM | OXYGEN SATURATION: 100 % | WEIGHT: 134 LBS | HEART RATE: 75 BPM | SYSTOLIC BLOOD PRESSURE: 117 MMHG | TEMPERATURE: 98.2 F | BODY MASS INDEX: 23.74 KG/M2

## 2024-12-20 DIAGNOSIS — Z00.00 ENCOUNTER FOR GENERAL ADULT MEDICAL EXAMINATION W/OUT ABNORMAL FINDINGS: ICD-10-CM

## 2024-12-20 PROCEDURE — 99386 PREV VISIT NEW AGE 40-64: CPT

## 2024-12-24 ENCOUNTER — TRANSCRIPTION ENCOUNTER (OUTPATIENT)
Age: 44
End: 2024-12-24

## 2024-12-24 LAB
25(OH)D3 SERPL-MCNC: 33.6 NG/ML
ALBUMIN SERPL ELPH-MCNC: 4.4 G/DL
ALP BLD-CCNC: 52 U/L
ALT SERPL-CCNC: 26 U/L
ANION GAP SERPL CALC-SCNC: 12 MMOL/L
AST SERPL-CCNC: 20 U/L
BASOPHILS # BLD AUTO: 0.04 K/UL
BASOPHILS NFR BLD AUTO: 0.7 %
BILIRUB SERPL-MCNC: 0.7 MG/DL
BUN SERPL-MCNC: 9 MG/DL
CALCIUM SERPL-MCNC: 9.2 MG/DL
CHLORIDE SERPL-SCNC: 105 MMOL/L
CHOLEST SERPL-MCNC: 158 MG/DL
CO2 SERPL-SCNC: 23 MMOL/L
CREAT SERPL-MCNC: 0.82 MG/DL
EGFR: 89 ML/MIN/1.73M2
EOSINOPHIL # BLD AUTO: 0.15 K/UL
EOSINOPHIL NFR BLD AUTO: 2.8 %
ESTIMATED AVERAGE GLUCOSE: 108 MG/DL
GLUCOSE SERPL-MCNC: 96 MG/DL
HBA1C MFR BLD HPLC: 5.4 %
HCT VFR BLD CALC: 43.4 %
HDLC SERPL-MCNC: 80 MG/DL
HGB BLD-MCNC: 14 G/DL
IMM GRANULOCYTES NFR BLD AUTO: 0.4 %
LDLC SERPL CALC-MCNC: 67 MG/DL
LYMPHOCYTES # BLD AUTO: 1.57 K/UL
LYMPHOCYTES NFR BLD AUTO: 29.3 %
MAN DIFF?: NORMAL
MCHC RBC-ENTMCNC: 31 PG
MCHC RBC-ENTMCNC: 32.3 G/DL
MCV RBC AUTO: 96.2 FL
MONOCYTES # BLD AUTO: 0.31 K/UL
MONOCYTES NFR BLD AUTO: 5.8 %
NEUTROPHILS # BLD AUTO: 3.27 K/UL
NEUTROPHILS NFR BLD AUTO: 61 %
NONHDLC SERPL-MCNC: 78 MG/DL
PLATELET # BLD AUTO: 268 K/UL
POTASSIUM SERPL-SCNC: 4.4 MMOL/L
PROT SERPL-MCNC: 7 G/DL
RBC # BLD: 4.51 M/UL
RBC # FLD: 12.3 %
SODIUM SERPL-SCNC: 141 MMOL/L
TRIGL SERPL-MCNC: 53 MG/DL
TSH SERPL-ACNC: 1.02 UIU/ML
WBC # FLD AUTO: 5.36 K/UL

## 2024-12-26 ENCOUNTER — TRANSCRIPTION ENCOUNTER (OUTPATIENT)
Age: 44
End: 2024-12-26

## 2025-02-27 ENCOUNTER — APPOINTMENT (OUTPATIENT)
Dept: GASTROENTEROLOGY | Facility: HOSPITAL | Age: 45
End: 2025-02-27